# Patient Record
Sex: FEMALE
[De-identification: names, ages, dates, MRNs, and addresses within clinical notes are randomized per-mention and may not be internally consistent; named-entity substitution may affect disease eponyms.]

---

## 2018-04-24 ENCOUNTER — HOSPITAL ENCOUNTER (EMERGENCY)
Dept: HOSPITAL 97 - ER | Age: 23
Discharge: HOME | End: 2018-04-24
Payer: COMMERCIAL

## 2018-04-24 DIAGNOSIS — R19.7: Primary | ICD-10-CM

## 2018-04-24 LAB
ALBUMIN SERPL BCP-MCNC: 3.2 G/DL (ref 3.2–5.5)
ALP SERPL-CCNC: 35 IU/L (ref 42–121)
ALT SERPL W P-5'-P-CCNC: 20 IU/L (ref 10–60)
AST SERPL W P-5'-P-CCNC: 18 IU/L (ref 10–42)
BUN BLD-MCNC: 5 MG/DL (ref 6–20)
GLUCOSE SERPLBLD-MCNC: 67 MG/DL (ref 65–120)
HCT VFR BLD CALC: 34.8 % (ref 36–45)
LIPASE SERPL-CCNC: 17 U/L (ref 22–51)
LYMPHOCYTES # SPEC AUTO: 2 K/UL (ref 0.7–4.9)
MCH RBC QN AUTO: 29.6 PG (ref 27–35)
MCV RBC: 87.9 FL (ref 80–100)
PMV BLD: 10.1 FL (ref 7.6–11.3)
POTASSIUM SERPL-SCNC: 3.1 MEQ/L (ref 3.6–5)
RBC # BLD: 3.96 M/UL (ref 3.86–4.86)

## 2018-04-24 PROCEDURE — 85025 COMPLETE CBC W/AUTO DIFF WBC: CPT

## 2018-04-24 PROCEDURE — 36415 COLL VENOUS BLD VENIPUNCTURE: CPT

## 2018-04-24 PROCEDURE — 96360 HYDRATION IV INFUSION INIT: CPT

## 2018-04-24 PROCEDURE — 80053 COMPREHEN METABOLIC PANEL: CPT

## 2018-04-24 PROCEDURE — 81025 URINE PREGNANCY TEST: CPT

## 2018-04-24 PROCEDURE — 81003 URINALYSIS AUTO W/O SCOPE: CPT

## 2018-04-24 PROCEDURE — 83690 ASSAY OF LIPASE: CPT

## 2018-04-24 PROCEDURE — 99284 EMERGENCY DEPT VISIT MOD MDM: CPT

## 2018-04-24 NOTE — ER
Nurse's Notes                                                                                     

 [facility]                                                                

Name: Eva Ray                                                                                 

Age: 22 yrs                                                                                       

Sex: Female                                                                                       

: 1995                                                                                   

MRN: [id number]                                                                                   

Arrival Date: 2018                                                                          

Time: 11:36                                                                                       

Account#: [id number]                                                                            

Bed 17                                                                                            

Private MD: None, None                                                                            

Diagnosis: Diarrhea, unspecified                                                                  

                                                                                                  

Presentation:                                                                                     

                                                                                             

11:39 Presenting complaint: Patient states: i have a lot of diarrhea and i cant take over the tw2 

      counter medicine, i dont have a doctor so they told me to come here, 3 days of              

      diarrhea, denies n/v. Transition of care: patient was not received from another setting     

      of care. Onset of symptoms was 2018. Initial Sepsis Screen: Does the patient      

      meet any 2 criteria? No. Patient's initial sepsis screen is negative. Does the patient      

      have a suspected source of infection? No. Patient's initial sepsis screen is negative.      

      Care prior to arrival: None.                                                                

11:39 Method Of Arrival: Ambulatory                                                           tw2 

11:39 Acuity: ALYSSA 4                                                                           tw2 

                                                                                                  

Triage Assessment:                                                                                

11:39 General: Appears in no apparent distress. uncomfortable, Behavior is calm, cooperative, hj  

      appropriate for age. Pain: Denies pain. GI: Reports diarrhea.                               

                                                                                                  

OB/GYN:                                                                                           

11:40 LMP 2018                                                                           tw2 

                                                                                                  

Historical:                                                                                       

- Allergies:                                                                                      

11:39 No Known Allergies;                                                                     hj  

- Home Meds:                                                                                      

11:39 None [Active];                                                                          hj  

- PMHx:                                                                                           

11:39 None;                                                                                   hj  

- PSHx:                                                                                           

11:39 None;                                                                                   hj  

                                                                                                  

- Immunization history:: Adult Immunizations up to date.                                          

- Social history:: Smoking status: Patient/guardian denies using tobacco,                         

  Patient/guardian denies using alcohol.                                                          

                                                                                                  

                                                                                                  

Screenin:39 Abuse screen: Denies threats or abuse. Denies injuries from another. Nutritional        hj  

      screening: No deficits noted. Tuberculosis screening: No symptoms or risk factors           

      identified. Fall Risk None identified.                                                      

                                                                                                  

Assessment:                                                                                       

11:39 General: Appears in no apparent distress. uncomfortable, Behavior is calm, cooperative, hj  

      appropriate for age. Pain: Denies pain. Neuro: Level of Consciousness is awake, alert,      

      obeys commands, Oriented to person, place, time, situation, Appropriate for age.            

      Cardiovascular: Capillary refill < 3 seconds Patient's skin is warm and dry.                

      Respiratory: Airway is patent Respiratory effort is even, unlabored, Respiratory            

      pattern is regular, symmetrical. GI: No signs and/or symptoms were reported involving       

      the gastrointestinal system. : EENT: No signs and/or symptoms were reported regarding     

      the EENT system. Derm: No signs and/or symptoms reported regarding the dermatologic         

      system. Musculoskeletal: No signs and/or symptoms reported regarding the                    

      musculoskeletal system.                                                                     

12:39 Reassessment: Patient and/or family updated on plan of care and expected duration. Pain hj  

      level reassessed. Patient is alert, oriented x 3, equal unlabored respirations, skin        

      warm/dry/pink.                                                                              

13:39 Reassessment: Patient and/or family updated on plan of care and expected duration. Pain hj  

      level reassessed. Patient is alert, oriented x 3, equal unlabored respirations, skin        

      warm/dry/pink. Patient states feeling better.                                               

14:39 Reassessment: Patient and/or family updated on plan of care and expected duration. Pain hj  

      level reassessed. Patient is alert, oriented x 3, equal unlabored respirations, skin        

      warm/dry/pink. Patient states feeling better. Patient states symptoms have improved.        

15:39 Reassessment: Patient and/or family updated on plan of care and expected duration. Pain hj  

      level reassessed. Patient is alert, oriented x 3, equal unlabored respirations, skin        

      warm/dry/pink. Patient states feeling better.                                               

                                                                                                  

Vital Signs:                                                                                      

11:40  / 74; Pulse 86; Resp 17; Temp 98.0(TE); Pulse Ox 99% on R/A; Weight 54.43 kg     tw2 

      (R); Height 5 ft. 0 in. (152.40 cm); Pain 0/10;                                             

12:45  / 64; Pulse 82; Resp 14; Pulse Ox 99% on R/A;                                    mh5 

13:42  / 76; Pulse 86; Resp 14; Pulse Ox 99% on R/A;                                    mh5 

14:45  / 69; Pulse 84; Resp 18; Pulse Ox 100% on R/A;                                   hj  

15:54  / 75; Pulse 85; Resp 18; Pulse Ox 100% on R/A;                                   hj  

11:40 Body Mass Index 23.44 (54.43 kg, 152.40 cm)                                             tw2 

                                                                                                  

Vitals:                                                                                           

13:02 Fetal Heart Tones: 144 Fetal Heart Tone.                                                5 

                                                                                                  

ED Course:                                                                                        

11:36 Patient arrived in ED.                                                                  mr  

11:38 None, None is Private Physician.                                                        mr  

11:39 Patient has correct armband on for positive identification. Placed in gown. Bed in low  hj  

      position. Call light in reach. Side rails up X 1.                                           

11:39 Initial lab(s) drawn, by me, sent to lab. Inserted saline lock: 22 gauge in right       hj  

      forearm, using aseptic technique. Blood collected.                                          

11:40 Triage completed.                                                                       tw2 

11:40 Arm band placed on.                                                                     tw2 

11:59 Yury Morrison, KADEN is Primary Nurse.                                                  mg2 

12:01 Franki Allred NP is PHCP.                                                           pm1 

12:01 Davie Hanks MD is Attending Physician.                                                pm1 

16:00 No provider procedures requiring assistance completed. IV discontinued, intact,         hj  

      bleeding controlled, No redness/swelling at site. Pressure dressing applied.                

                                                                                                  

Administered Medications:                                                                         

12:09 Drug: NS 0.9% 1000 ml Route: IV; Rate: 1000 ml; Site: right hand;                       hj  

13:36 Follow up: IV Status: Completed infusion                                                hj  

14:08 Drug: Potassium Effervescent Tablet 50 mEq Route: PO;                                   hj  

14:14 Follow up: Response: No adverse reaction                                                hj  

                                                                                                  

                                                                                                  

Outcome:                                                                                          

15:48 Discharge ordered by MD.                                                                pm1 

16:00 Discharged to home ambulatory.                                                          hj  

16:00 Condition: stable                                                                           

16:00 Discharge instructions given to patient, Instructed on discharge instructions, follow       

      up and referral plans. Demonstrated understanding of instructions, follow-up care.          

16:01 Patient left the ED.                                                                      

                                                                                                  

Signatures:                                                                                       

Argelia Zarate                                mr SandersonMatt RN RN                                                      

Franki Allred NP                    NP   pm1                                                  

Rachel Sotelo RN RN   [facility]                                                  

Argelia Crowley                              [facility]                                                  

Yury Morrison RN                    RN   [facility]                                                  

                                                                                                  

**************************************************************************************************

## 2018-04-24 NOTE — EDPHYS
Physician Documentation                                                                           

 [facility]                                                                

Name: Eva Ray                                                                                 

Age: 22 yrs                                                                                       

Sex: Female                                                                                       

: 1995                                                                                   

MRN: [id number]                                                                                   

Arrival Date: 2018                                                                          

Time: 11:36                                                                                       

Account#: [id number]                                                                            

Bed 17                                                                                            

Private MD: None, None                                                                            

ED Physician Davie Hanks                                                                         

HPI:                                                                                              

                                                                                             

16:00 This 22 yrs old Unknown Female presents to ER via Ambulatory with complaints of         pm1 

      Diarrhea, 14 Wks pregnant.                                                                  

16:00 The patient presents to the emergency department with diarrhea. The patient presents to pm1 

      the emergency department with diarrhea, 12 times since the onset of symptoms. Onset:        

      The symptoms/episode began/occurred 3 day(s) ago. Possible causes: unknown. The             

      symptoms are aggravated by nothing. The symptoms are alleviated by nothing. Associated      

      signs and symptoms: Pertinent negatives: abdominal pain, dysuria, fever, nausea,            

      vomiting. Severity of symptoms: in the emergency department the symptoms have improved.     

      The patient has not recently seen a physician. Patient without any vaginal bleeding,        

      abdominal/pelvic pain, vaginal discharge, dysuria.                                          

                                                                                                  

OB/GYN:                                                                                           

11:40 LMP 2018                                                                           tw2 

                                                                                                  

Historical:                                                                                       

- Allergies:                                                                                      

11:39 No Known Allergies;                                                                     hj  

- Home Meds:                                                                                      

11:39 None [Active];                                                                          hj  

- PMHx:                                                                                           

11:39 None;                                                                                   hj  

- PSHx:                                                                                           

11:39 None;                                                                                   hj  

                                                                                                  

- Immunization history:: Adult Immunizations up to date.                                          

- Social history:: Smoking status: Patient/guardian denies using tobacco,                         

  Patient/guardian denies using alcohol.                                                          

                                                                                                  

                                                                                                  

ROS:                                                                                              

16:00 Constitutional: Negative for fever, chills, and weight loss, Eyes: Negative for injury, pm1 

      pain, redness, and discharge, ENT: Negative for injury, pain, and discharge, Neck:          

      Negative for injury, pain, and swelling, Cardiovascular: Negative for chest pain,           

      palpitations, and edema, Respiratory: Negative for shortness of breath, cough,              

      wheezing, and pleuritic chest pain.                                                         

16:00 Back: Negative for injury and pain, : Negative for injury, bleeding, discharge, and       

      swelling, MS/Extremity: Negative for injury and deformity, Skin: Negative for injury,       

      rash, and discoloration, Neuro: Negative for headache, weakness, numbness, tingling,        

      and seizure.                                                                                

16:00 Abdomen/GI: Positive for diarrhea, Negative for abdominal pain, nausea and vomiting,        

      constipation.                                                                               

                                                                                                  

Exam:                                                                                             

16:00 Constitutional:  This is a well developed, well nourished patient who is awake, alert,  pm1 

      and in no acute distress. Head/Face:  Normocephalic, atraumatic. Neck:  Trachea             

      midline, no thyromegaly or masses palpated, and no cervical lymphadenopathy.  Supple,       

      full range of motion without nuchal rigidity, or vertebral point tenderness.  No            

      Meningismus. Chest/axilla:  Normal chest wall appearance and motion.  Nontender with no     

      deformity.  No lesions are appreciated. Cardiovascular:  Regular rate and rhythm with a     

      normal S1 and S2.  No gallops, murmurs, or rubs.  Normal PMI, no JVD.  No pulse             

      deficits. Respiratory:  Lungs have equal breath sounds bilaterally, clear to                

      auscultation and percussion.  No rales, rhonchi or wheezes noted.  No increased work of     

      breathing, no retractions or nasal flaring.                                                 

16:00 Back:  No spinal tenderness.  No costovertebral tenderness.  Full range of motion.          

      Skin:  Warm, dry with normal turgor.  Normal color with no rashes, no lesions, and no       

      evidence of cellulitis. MS/ Extremity:  Pulses equal, no cyanosis.  Neurovascular           

      intact.  Full, normal range of motion.                                                      

16:00 Abdomen/GI: Inspection: gravid appearance, is noted, Bowel sounds: normal, Palpation:       

      abdomen is soft and non-tender.                                                             

16:00 Neuro: Orientation: is normal, Motor: moves all fours.                                      

                                                                                                  

Vital Signs:                                                                                      

11:40  / 74; Pulse 86; Resp 17; Temp 98.0(TE); Pulse Ox 99% on R/A; Weight 54.43 kg     tw2 

      (R); Height 5 ft. 0 in. (152.40 cm); Pain 0/10;                                             

12:45  / 64; Pulse 82; Resp 14; Pulse Ox 99% on R/A;                                    mh5 

13:42  / 76; Pulse 86; Resp 14; Pulse Ox 99% on R/A;                                    mh5 

14:45  / 69; Pulse 84; Resp 18; Pulse Ox 100% on R/A;                                   hj  

15:54  / 75; Pulse 85; Resp 18; Pulse Ox 100% on R/A;                                   hj  

11:40 Body Mass Index 23.44 (54.43 kg, 152.40 cm)                                             tw2 

                                                                                                  

MDM:                                                                                              

12:04 Patient medically screened.                                                             pm1 

15:47 Data reviewed: vital signs. Data interpreted: Pulse oximetry: on room air is 99 %.      pm1 

      Interpretation: normal. Counseling: I had a detailed discussion with the patient and/or     

      guardian regarding: the historical points, exam findings, and any diagnostic results        

      supporting the discharge/admit diagnosis, lab results, the need for outpatient follow       

      up, to return to the emergency department if symptoms worsen or persist or if there are     

      any questions or concerns that arise at home.                                               

                                                                                                  

                                                                                             

12:09 Order name: CBC with Diff; Complete Time: 14:08                                         pm1 

                                                                                             

12:09 Order name: Lipase; Complete Time: 14:08                                                pm1 

                                                                                             

12:09 Order name: CMP; Complete Time: 14:08                                                   pm1 

                                                                                             

12:36 Order name: Urine Dipstick--Ancillary (enter results); Complete Time: 14:08             ag  

                                                                                             

12:36 Order name: Urine Pregnancy--Ancillary (enter results); Complete Time: 14:08            ag  

                                                                                             

12:09 Order name: Urine Pregnancy Test (obtain specimen); Complete Time: 12:36                pm1 

                                                                                             

12:09 Order name: IV Saline Lock; Complete Time: 12:37                                        pm1 

                                                                                             

12:09 Order name: Labs collected and sent; Complete Time: 12:37                               pm1 

                                                                                             

12:09 Order name: Urine Dipstick-Ancillary (obtain specimen); Complete Time: 12:37            pm1 

                                                                                             

12:09 Order name: FHT's; Complete Time: 13:36                                                 pm1 

                                                                                             

12:40 Order name: Labs - recollect needed; Complete Time: 12:48                               ag  

                                                                                                  

Administered Medications:                                                                         

12:09 Drug: NS 0.9% 1000 ml Route: IV; Rate: 1000 ml; Site: right hand;                       hj  

13:36 Follow up: IV Status: Completed infusion                                                hj  

14:08 Drug: Potassium Effervescent Tablet 50 mEq Route: PO;                                   hj  

14:14 Follow up: Response: No adverse reaction                                                hj  

                                                                                                  

                                                                                                  

Disposition:                                                                                      

17:18 Co-signature as Attending Physician, Davie Hanks MD.                                    rn  

                                                                                                  

Disposition:                                                                                      

18 15:48 Discharged to Home. Impression: Diarrhea, unspecified.                             

- Condition is Stable.                                                                            

- Discharge Instructions: Food Choices to Help Relieve Diarrhea, Adult, Diarrhea, Viral           

  Gastroenteritis.                                                                                

                                                                                                  

- Medication Reconciliation Form, Thank You Letter form.                                          

- Follow up: Emergency Department; When: As needed; Reason: Worsening of condition.               

  Follow up: Private Physician; When: 2 - 3 days; Reason: Recheck today's complaints,             

  Continuance of care, Re-evaluation by your physician.                                           

- Problem is new.                                                                                 

- Symptoms have improved.                                                                         

                                                                                                  

                                                                                                  

                                                                                                  

Signatures:                                                                                       

Dispatcher [employer]                           EDDavie Hope MD MD rn Gallardo, Ana ag Joaquin, Henry RN                      Franki Hodges, PRECIOUS                    NP   pm1                                                  

                                                                                                  

**************************************************************************************************